# Patient Record
Sex: FEMALE | Race: WHITE | Employment: FULL TIME | URBAN - METROPOLITAN AREA
[De-identification: names, ages, dates, MRNs, and addresses within clinical notes are randomized per-mention and may not be internally consistent; named-entity substitution may affect disease eponyms.]

---

## 2017-05-26 ENCOUNTER — OFFICE VISIT (OUTPATIENT)
Dept: ENT CLINIC | Age: 52
End: 2017-05-26

## 2017-05-26 VITALS
BODY MASS INDEX: 24.16 KG/M2 | HEART RATE: 99 BPM | HEIGHT: 65 IN | DIASTOLIC BLOOD PRESSURE: 74 MMHG | WEIGHT: 145 LBS | SYSTOLIC BLOOD PRESSURE: 123 MMHG

## 2017-05-26 DIAGNOSIS — J30.1 SEASONAL ALLERGIC RHINITIS DUE TO POLLEN: ICD-10-CM

## 2017-05-26 DIAGNOSIS — Z86.69 HISTORY OF RECURRENT EAR INFECTION: Primary | ICD-10-CM

## 2017-05-26 DIAGNOSIS — R42 DISEQUILIBRIUM: ICD-10-CM

## 2017-05-26 DIAGNOSIS — H90.3 BILATERAL HIGH FREQUENCY SENSORINEURAL HEARING LOSS: ICD-10-CM

## 2017-05-26 DIAGNOSIS — H93.13 SUBJECTIVE TINNITUS OF BOTH EARS: ICD-10-CM

## 2017-05-26 PROCEDURE — 99244 OFF/OP CNSLTJ NEW/EST MOD 40: CPT | Performed by: OTOLARYNGOLOGY

## 2017-06-27 ENCOUNTER — OFFICE VISIT (OUTPATIENT)
Dept: ENT CLINIC | Age: 52
End: 2017-06-27

## 2017-06-27 VITALS — SYSTOLIC BLOOD PRESSURE: 139 MMHG | DIASTOLIC BLOOD PRESSURE: 82 MMHG | HEART RATE: 83 BPM

## 2017-06-27 DIAGNOSIS — R42 DIZZINESS AND GIDDINESS: Primary | ICD-10-CM

## 2017-06-27 PROCEDURE — 92540 BASIC VESTIBULAR EVALUATION: CPT | Performed by: AUDIOLOGIST

## 2017-06-27 PROCEDURE — 92557 COMPREHENSIVE HEARING TEST: CPT | Performed by: AUDIOLOGIST

## 2017-06-27 PROCEDURE — 92547 SUPPLEMENTAL ELECTRICAL TEST: CPT | Performed by: AUDIOLOGIST

## 2017-06-27 PROCEDURE — 92550 TYMPANOMETRY & REFLEX THRESH: CPT | Performed by: AUDIOLOGIST

## 2017-07-19 ENCOUNTER — OFFICE VISIT (OUTPATIENT)
Dept: ENT CLINIC | Age: 52
End: 2017-07-19

## 2017-07-19 VITALS
HEART RATE: 88 BPM | BODY MASS INDEX: 26.98 KG/M2 | WEIGHT: 161.9 LBS | SYSTOLIC BLOOD PRESSURE: 123 MMHG | HEIGHT: 65 IN | DIASTOLIC BLOOD PRESSURE: 70 MMHG

## 2017-07-19 DIAGNOSIS — R42 DIZZINESS AND GIDDINESS: Primary | ICD-10-CM

## 2017-07-19 DIAGNOSIS — H93.8X1 EAR PRESSURE, RIGHT: ICD-10-CM

## 2017-07-19 PROCEDURE — 99214 OFFICE O/P EST MOD 30 MIN: CPT | Performed by: OTOLARYNGOLOGY

## 2017-07-19 RX ORDER — LISINOPRIL 10 MG/1
TABLET ORAL
COMMUNITY
Start: 2017-06-02

## 2017-07-19 RX ORDER — TRIAMTERENE AND HYDROCHLOROTHIAZIDE 37.5; 25 MG/1; MG/1
1 CAPSULE ORAL DAILY
Qty: 30 CAPSULE | Refills: 1 | Status: SHIPPED | OUTPATIENT
Start: 2017-07-19

## 2017-08-31 ENCOUNTER — OFFICE VISIT (OUTPATIENT)
Dept: ORTHOPEDIC SURGERY | Age: 52
End: 2017-08-31

## 2017-08-31 VITALS — HEIGHT: 69 IN | BODY MASS INDEX: 23.85 KG/M2 | WEIGHT: 161 LBS

## 2017-08-31 DIAGNOSIS — M25.562 ACUTE PAIN OF LEFT KNEE: ICD-10-CM

## 2017-08-31 DIAGNOSIS — M22.42 CHONDROMALACIA OF PATELLA, LEFT: Primary | ICD-10-CM

## 2017-08-31 PROCEDURE — 99203 OFFICE O/P NEW LOW 30 MIN: CPT | Performed by: INTERNAL MEDICINE

## 2017-08-31 PROCEDURE — 73562 X-RAY EXAM OF KNEE 3: CPT | Performed by: INTERNAL MEDICINE

## 2017-08-31 PROCEDURE — L1820 KO ELAS W/ CONDYLE PADS & JO: HCPCS | Performed by: INTERNAL MEDICINE

## 2017-08-31 RX ORDER — KETOROLAC TROMETHAMINE 10 MG/1
10 TABLET, FILM COATED ORAL 3 TIMES DAILY
Qty: 15 TABLET | Refills: 0 | Status: SHIPPED | OUTPATIENT
Start: 2017-08-31

## 2017-09-01 ENCOUNTER — TELEPHONE (OUTPATIENT)
Dept: ORTHOPEDIC SURGERY | Age: 52
End: 2017-09-01

## 2017-09-08 ENCOUNTER — TELEPHONE (OUTPATIENT)
Dept: ORTHOPEDIC SURGERY | Age: 52
End: 2017-09-08

## 2017-09-08 NOTE — TELEPHONE ENCOUNTER
Patient calling stating that at her last office visit she was told if she was still in pain she could come in to get an injection. She is aware that Dr Tammy Mcclure is not in the office today however she does nto want to wait until next week. She does nto want to have to wait all weekend in pain. She is asking what she can do to relieve the pain? Can she see anyone today for an injection?      Please call to discuss  [phone]

## 2017-09-11 ENCOUNTER — TELEPHONE (OUTPATIENT)
Dept: ORTHOPEDIC SURGERY | Age: 52
End: 2017-09-11

## 2017-09-11 ENCOUNTER — OFFICE VISIT (OUTPATIENT)
Dept: ORTHOPEDIC SURGERY | Age: 52
End: 2017-09-11

## 2017-09-11 VITALS — BODY MASS INDEX: 23.85 KG/M2 | WEIGHT: 161 LBS | HEIGHT: 69 IN

## 2017-09-11 DIAGNOSIS — M22.42 CHONDROMALACIA OF PATELLA, LEFT: Primary | ICD-10-CM

## 2017-09-11 DIAGNOSIS — G89.29 CHRONIC PAIN OF LEFT KNEE: ICD-10-CM

## 2017-09-11 DIAGNOSIS — M25.562 CHRONIC PAIN OF LEFT KNEE: ICD-10-CM

## 2017-09-11 PROCEDURE — 20611 DRAIN/INJ JOINT/BURSA W/US: CPT | Performed by: INTERNAL MEDICINE

## 2017-09-11 PROCEDURE — 99213 OFFICE O/P EST LOW 20 MIN: CPT | Performed by: INTERNAL MEDICINE

## 2017-09-11 RX ORDER — TRAMADOL HYDROCHLORIDE 50 MG/1
50 TABLET ORAL EVERY 6 HOURS PRN
Qty: 30 TABLET | Refills: 0 | Status: SHIPPED | OUTPATIENT
Start: 2017-09-11 | End: 2017-10-19 | Stop reason: SDUPTHER

## 2017-09-11 RX ORDER — MELOXICAM 15 MG/1
15 TABLET ORAL DAILY PRN
Qty: 30 TABLET | Refills: 2 | Status: SHIPPED | OUTPATIENT
Start: 2017-09-11 | End: 2019-05-28 | Stop reason: SDUPTHER

## 2017-09-13 ENCOUNTER — OFFICE VISIT (OUTPATIENT)
Dept: ORTHOPEDIC SURGERY | Age: 52
End: 2017-09-13

## 2017-09-13 DIAGNOSIS — M94.262 CHONDROMALACIA, KNEE, LEFT: ICD-10-CM

## 2017-09-13 DIAGNOSIS — M23.301 DEGENERATIVE TEAR OF LATERAL MENISCUS OF LEFT KNEE: ICD-10-CM

## 2017-09-13 DIAGNOSIS — M17.12 PRIMARY OSTEOARTHRITIS OF LEFT KNEE: Primary | ICD-10-CM

## 2017-09-13 PROCEDURE — 99213 OFFICE O/P EST LOW 20 MIN: CPT | Performed by: INTERNAL MEDICINE

## 2017-09-18 PROBLEM — M94.269 CHONDROMALACIA, KNEE: Status: ACTIVE | Noted: 2017-09-18

## 2017-09-18 PROBLEM — M17.12 PRIMARY OSTEOARTHRITIS OF LEFT KNEE: Status: ACTIVE | Noted: 2017-09-18

## 2017-09-19 ENCOUNTER — HOSPITAL ENCOUNTER (OUTPATIENT)
Dept: PHYSICAL THERAPY | Age: 52
Discharge: HOME | End: 2017-09-30
Admitting: INTERNAL MEDICINE

## 2017-09-19 NOTE — FLOWSHEET NOTE
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, left  · Treatment Diagnosis: M25.562 L knee pain  Insurance/Certification information:  PT Insurance Information: [place], $25 co-pay, 30 visits  Physician Information:  Referring Practitioner: Ric Carranza MD  Plan of care signed (Y/N):     Date of Patient follow up with Physician:     G-Code (if applicable):      Date G-Code Applied:    PT G-Codes  Functional Assessment Tool Used: LEFS  Score: 31.25% disability  Functional Limitation: Mobility: Walking and moving around  Mobility: Walking and Moving Around Current Status (): At least 20 percent but less than 40 percent impaired, limited or restricted  Mobility: Walking and Moving Around Goal Status ():  At least 1 percent but less than 20 percent impaired, limited or restricted    Progress Note: [x]  Yes  []  No  Next due by: Visit #10       Latex Allergy:  [x]NO      []YES  Preferred Language for Healthcare:   [x]English       []other:    Visit # Insurance Allowable   1 30     Pain level:  0/10     SUBJECTIVE:  See eval    OBJECTIVE: See eval  Observation:   Test measurements:      RESTRICTIONS/PRECAUTIONS: HTN    Exercises/Interventions:     Hamstring stretch Verbal cueing for 2 for 30 seconds   Quad stretch Verbal cueing for 2 for 30 seconds   Gastroc stretch Verbal cueing for 2 for 30 seconds   IT band stretch Verbal cueing for 2 for 30 seconds   Quad sets Verbal cueing for 2 sets of 10   Short arc quads Verbal cueing for 1 sets of 15 Functional Deficits. - Patient will return to desired, higher level,  functional activities without increased symptoms or restriction. Progression Towards Functional goals:  [] Patient is progressing as expected towards functional goals listed. [] Progression is slowed due to complexities listed. [] Progression has been slowed due to co-morbidities. [x] Plan just implemented, too soon to assess goals progression  [] Other:     ASSESSMENT:  See eval    Treatment/Activity Tolerance:  [x] Patient tolerated treatment well [] Patient limited by fatique  [] Patient limited by pain  [] Patient limited by other medical complications  [x] Other: Pt denied pain with interventions and verbalized and demonstrated understanding for all interventions this date. Prognosis: [x] Good [] Fair  [] Poor    Patient Requires Follow-up: [x] Yes  [] No    PLAN: See eval  [] Continue per plan of care [] Alter current plan (see comments)  [x] Plan of care initiated [] Hold pending MD visit [] Discharge    Plan for Next Session:  Progress quad control activities as tolerated with a functional progression toward gravity reduced track and more upright functional positions.     Electronically signed by: Ko Islas PT, DPT, ATC

## 2017-09-19 NOTE — PLAN OF CARE
[address] [address]  Phone [phone]   Fax [phone]                                                      Physical Therapy Certification    Dear Referring Practitioner: Juan Antonio Doll MD,    We had the pleasure of evaluating the following patient for physical therapy services at [address] [place] [address]. A summary of our findings can be found in the initial assessment below. This includes our plan of care. If you have any questions or concerns regarding these findings, please do not hesitate to contact me at the office phone number checked above. Thank you for the referral.       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 The pt reports she moved 8 yards of [facility] in . 10 days after she woke up and her left knee ached. She worked through it and referred to her doctor who agreed. Then pain returned in August \"full force\". Pt denies previous injuries to her left knee. Pt had an x-ray and MRI and a cortisone injection 10 days ago.  Her physician prescribed a knee brace with a lateral buttress. Relevant Medical History:HTN    Functional Disability Index:LEFS 31.25% diability    Pain Scale: currently 0/10, at best 0/10, at worst 10/10    Type:    - Intermittent     Easing factors:    - resting  - medications  - ice    Provocative factors:    - sitting  - standing  - walking  - activity and movement    Night Pain:  - complained of night pain associated with sleep position. Paresthesia complaints:   - no paresthesia complaints     Functional Limitations/Impairments:   - Standing   - Walking      - Squatting/bending   - kneeling  - lifting  - sleeping   - Stairs             - ADL's   - Sports/Recreation         Occupation/School:   -  and     Living Status/Prior Level of Function: This patient was independent in ADL's and IADL's prior to onset of symptoms. Sport/recreational activities:   - walking  - running  - backyard basketball with son     PACEMAKER:  - Denied having a pacemaker that would contraindicate the use of electrical modalities. METAL IMPLANTS:  - Denied metal implants that would contraindicate the use of thermal modalities. CANCER HISTORY:  - Denied a history of cancer that would contraindicate thermal modalities. OBJECTIVE:      Posture: normal patella height    Circumferential Measures:   Inferior pole 36.0 cm, superior pole 38.0 cm, 2 cm proximal 38.5 cm  inferior pole 36.0 cm, superior pole 37.5 cm, 2 cm proximal 39.0 cm    Palpation:   -demonstrated diffuse tenderness to palpation over the soft tissues of the IT band insertion.     Joint mobility and reactivity:   Normal  patella mobility, crepitus felt    ROM:   L 0-134 degrees, R WFL    Strength/Neuromuscular control:  Hip flexion: R 4/5, L 4-/5  IR R 4/5, L 4/5   ER R 4/5, L 4/5  Knee ext R 5/5, L 4+/5  Knee flexion R 4+/5, L 4-/5 painful  DF R 5/5, L 5/5   PF R 5/5, L 5/5    Voluntary quadriceps contraction:   [] Good (Strong and voluntary contraction)  [x] Good - (voluntary but assymetrical contraction)   [] Fair + (voluntary but weak contraction)   [] Fair - (voluntary but inconsistent contraction)  [] Poor (no volitional contraction)    Dermatomes and DTRs not warranted    Orthopedic Special Tests:    - Joint Line Compression  - Overpressure  +crepitus  - tilt    Functional Mobility/Transfers:   Functional transitions:  Sit to stand: []slow and labored [x]independent []min assist []mod assist []max assist   Plinth mobility:[]slow and labored [x]independent []min assist []mod assist []max assist    Gait: (include devices/WB status)  - demonstrated no gait deviations and a normal gait pattern  Weightbearing:  []NWB  []WBAT  [x]unrestricted   []other:      Special Tests/Functional tests:  Timed Up and Go (TUG):  [x]   12 seconds or faster (0% functional limitation)  []   13-14 seconds (At least 1% but less than 20% functional limitation)  []   15-16 seconds (At least 20% but less than 40% functional limitation)  []   17-18 seconds (At least 40% but less than 60% functional limitation)  []   19-20 seconds (At least 60% but less than 80% functional limitation)  []   21-22 seconds (At least 80% but less than 100% functional limitation)  []   23 seconds or slower (100% functional limitation)             [x] Patient history, allergies, meds reviewed. Medical chart reviewed. See intake form. Review Of Systems (ROS):  [x]Performed Review of systems (Integumentary, CardioPulmonary, Neurological) by intake and observation. Intake form has been scanned into medical record. Patient has been instructed to contact their primary care physician regarding ROS issues if not already being addressed at this time. Objective Review of Systems:  Cognitive, Communication, Behavior and Learning:  - The patient was alert, spoke coherently and was oriented to person, place and time.   - Demonstrated no barriers to communication or processing information.  - Appeared barriers. Tolerance of evaluation/treatment:   - Good     Physical Therapy Evaluation Complexity Justification  [] A history of present problem with:  [] no personal factors and/or comorbidities that impact the plan of care;  []1-2 personal factors and/or comorbidities that impact the plan of care  [x]3 personal factors and/or comorbidities that impact the plan of care  [] An examination of body systems using standardized tests and measures addressing any of the following: body structures and functions (impairments), activity limitations, and/or participation restrictions;:  [] a total of 1-2 or more elements   [x] a total of 3 or more elements   [] a total of 4 or more elements   [] A clinical presentation with:  [x] stable and/or uncomplicated characteristics   [] evolving clinical presentation with changing characteristics  [] unstable and unpredictable characteristics;   [] Clinical decision making of [x] low, [] moderate, [] high complexity using standardized patient assessment instrument and/or measurable assessment of functional outcome.     [x] EVAL (LOW) 85401 (typically 30 minutes face-to-face)  [] EVAL (MOD) 05044 (typically 30 minutes face-to-face)  [] EVAL (HIGH) 34840 (typically 45 minutes face-to-face)  [] RE-EVAL     Co-morbidities/Complexities (which will affect course of rehabilitation):   []None           Arthritic conditions   []Rheumatoid arthritis (M05.9)  []Osteoarthritis (M19.91)   Cardiovascular conditions   [x]Hypertension (I10)  []Hyperlipidemia (E78.5)  []Angina pectoris (I20)  []Atherosclerosis (I70)   Musculoskeletal conditions   []Disc pathology   []Congenital spine pathologies   []Prior surgical intervention  []Osteoporosis (M81.8)  []Osteopenia (M85.8)   Endocrine conditions   []Hypothyroid (E03.9)  []Hyperthyroid Gastrointestinal conditions   []Constipation (O90.64)   Metabolic conditions   []Morbid obesity (E66.01)  []Diabetes type 1(E10.65) or 2 (E11.65)   []Neuropathy (G60.9)

## 2017-10-04 ENCOUNTER — OFFICE VISIT (OUTPATIENT)
Dept: ORTHOPEDIC SURGERY | Age: 52
End: 2017-10-04

## 2017-10-04 DIAGNOSIS — M94.262 CHONDROMALACIA, KNEE, LEFT: ICD-10-CM

## 2017-10-04 DIAGNOSIS — M22.42 CHONDROMALACIA OF PATELLA, LEFT: ICD-10-CM

## 2017-10-04 DIAGNOSIS — M17.12 PRIMARY OSTEOARTHRITIS OF LEFT KNEE: Primary | ICD-10-CM

## 2017-10-04 PROBLEM — M22.40 CHONDROMALACIA OF PATELLA: Status: ACTIVE | Noted: 2017-10-04

## 2017-10-04 PROCEDURE — 99213 OFFICE O/P EST LOW 20 MIN: CPT | Performed by: INTERNAL MEDICINE

## 2017-10-04 NOTE — PROGRESS NOTES
Chief Complaint:   Chief Complaint   Patient presents with    Knee Pain     f/u L. knee pain          History of Present Illness:       Patient is a 50 y.o. female returns follow up for the above complaint. The patient was last seen approximately 3 weeksago. The symptoms are worsening since the last visit. The patient has had no further testing for the problem. The patient is noted gradual worsening of her knee pain but it has not achieved pretreatment status with respect to pain. Majority of her pain localizes to the lateral greater than medial border of the patella in the anterior compartment regions any sheet sometime since his pain posteriorly but she feels that this is related to the functional knee brace. No pattern of activity related swelling she was unable to attend only one session of physical therapy relative to monetary constraints later to insurance coverage of physical therapy requiring a high deductible payment up front.     She has transition to home exercises despite this she has noted general worsening of her knee pain she will like to consider other options for treatment     Past Medical History:        Past Medical History:   Diagnosis Date    Anesthesia     slow to wake    Depression     Environmental allergies         Present Medications:         Current Outpatient Prescriptions   Medication Sig Dispense Refill    meloxicam (MOBIC) 15 MG tablet Take 1 tablet by mouth daily as needed for Pain 30 tablet 2    ketorolac (TORADOL) 10 MG tablet Take 1 tablet by mouth three times daily Discontinue all other NSAIDs during the course of treatment and resumed thereafter 15 tablet 0    lisinopril (PRINIVIL;ZESTRIL) 10 MG tablet       triamterene-hydrochlorothiazide (DYAZIDE) 37.5-25 MG per capsule Take 1 capsule by mouth daily 30 capsule 1    esomeprazole Magnesium (NEXIUM) 40 MG PACK Take 40 mg by mouth daily      venlafaxine (EFFEXOR-XR) 150 MG XR capsule Take 150 mg by mouth daily  Primary osteoarthritis of left knee Yes    Chondromalacia, knee, left     Chondromalacia of patella, left               Plan: Activity modification-PPP  Continue functional patellofemoral bracing with ADLs. Euflexxa injection series trial preauthorization  Transition to Functional program-Program reflective of PPP    I believe her pain generator is primarily of the anterior compartment and/or lateral compartment chondromalacia proceed as outlined above       Orders:        Orders Placed This Encounter   Procedures    Ambulatory referral to Physical Therapy     Referral Priority:   Routine     Referral Type:   Eval and Treat     Referral Reason:   Specialty Services Required     Requested Specialty:   Physical Therapy     Number of Visits Requested:   1         Sabina Cabral MD.      Neftali Due: \"This note was dictated with voice recognition software. Though review and correction are routine, we apologize for any errors. \"

## 2017-10-05 ENCOUNTER — TELEPHONE (OUTPATIENT)
Dept: ORTHOPEDIC SURGERY | Age: 52
End: 2017-10-05

## 2017-10-10 ENCOUNTER — TELEPHONE (OUTPATIENT)
Dept: ORTHOPEDIC SURGERY | Age: 52
End: 2017-10-10

## 2017-10-10 NOTE — TELEPHONE ENCOUNTER
called patient to ler her know her HA inj was approved. Patient was unsue of schedule, she will be calling back to schedule her appt.

## 2017-10-16 ENCOUNTER — OFFICE VISIT (OUTPATIENT)
Dept: ORTHOPEDIC SURGERY | Age: 52
End: 2017-10-16

## 2017-10-16 DIAGNOSIS — M94.262 CHONDROMALACIA OF LEFT KNEE: ICD-10-CM

## 2017-10-16 DIAGNOSIS — M22.42 CHONDROMALACIA OF LEFT PATELLA: ICD-10-CM

## 2017-10-16 DIAGNOSIS — M17.12 PRIMARY OSTEOARTHRITIS OF LEFT KNEE: Primary | ICD-10-CM

## 2017-10-16 PROCEDURE — 99212 OFFICE O/P EST SF 10 MIN: CPT | Performed by: INTERNAL MEDICINE

## 2017-10-16 PROCEDURE — 20610 DRAIN/INJ JOINT/BURSA W/O US: CPT | Performed by: INTERNAL MEDICINE

## 2017-10-16 NOTE — PROGRESS NOTES
administration details:    Date:  10/16/17    Site:L. knee    Lot #:  [id number]    Expiration Date: 8/18/2018    NDC#: [address]

## 2017-10-16 NOTE — PROGRESS NOTES
MULTIVITAMIN-MINERALS) tablet Take 1 tablet by mouth daily      ferrous sulfate 325 (65 FE) MG tablet Take 325 mg by mouth daily (with breakfast)       No current facility-administered medications for this visit. Allergies: Allergies   Allergen Reactions    Latex      rash    Other      bandaid blister           Review of Systems:    Pertinent items are noted in HPI        Vital Signs: There were no vitals filed for this visit. General Exam:     Constitutional: Patient is adequately groomed with no evidence of malnutrition    Physical Exam: left knee      Primary Exam:    Inspection:  No deformity atrophy or effusion      Palpation:  No focal trigger point tenderness      Range of Motion:  Stable change from previous      Strength:  Normal with SLR      Special Tests:  Ligamentous testing stable      Skin: There are no rashes, ulcerations or lesions. Gait: nonantalgic      Neurovascular - non focal and intact       Additional Comments:        Additional Examinations:                   Office Imaging Results/Procedures PerformedToday:     Logic E Ultrasound/ 10 HZ    The patient was placed supine on the examination table with the knees supported. The   left     Lower extremity was slightly abducted . The ultrasound was placed on knee preset function and the linear transducer was placed transversely  over the medial patellofemoral joint and the medial patella femoral  joint recess was identified. The skin was prepped in sterile fashion. Sterile ultrasound gel  and topical anesthetic were utilized. Using axial technique, a 22 GA 40 mm needle was advanced under direct guidance into the medial patellofemoral joint recess and 2 ml of Euflexxa  was injected . The joint space was visualized distending with Injectate. There was no resistance to the Injectate. Patient tolerated this with minimal to no discomfort. Band-Aid applied to puncture wound.     Technically successful ultrasound guided injection    The media patellafemoral joint recess was visualized in short axis. No evidence of effusion, soft tissue mass or cystic lesions. Office Procedures:     Orders Placed This Encounter   Procedures    US Guided Needle Placement     Order Specific Question:   Reason for exam:     Answer:   knee pain    ME ARTHROCENTESIS ASPIR&/INJ MAJOR JT/BURSA W/O US    EUFLEXXA INJ PER DOSE           Other Outside Imaging and Testing Personally Reviewed:       none          Assessment   Impression: . Encounter Diagnoses   Name Primary?  Primary osteoarthritis of left knee Yes    Chondromalacia of left knee     Chondromalacia of left patella               Plan:        postinjection protocol  Cane/crutch-assisted weightbearing short-term for the next 3-5 days  Continue home exercise program and transition/start  GA P program  Continue NSAIDs/Mobic when necessary GI precaution       Orders:        Orders Placed This Encounter   Procedures    US Guided Needle Placement     Order Specific Question:   Reason for exam:     Answer:   knee pain    ME ARTHROCENTESIS ASPIR&/INJ MAJOR JT/BURSA W/O US   Analy Bravo MD.      Disclaimer: \"This note was dictated with voice recognition software. Though review and correction are routine, we apologize for any errors. \"

## 2017-10-19 ENCOUNTER — HOSPITAL ENCOUNTER (OUTPATIENT)
Dept: PHYSICAL THERAPY | Age: 52
Discharge: HOME | End: 2017-10-19
Admitting: INTERNAL MEDICINE

## 2017-10-19 DIAGNOSIS — M22.42 CHONDROMALACIA OF PATELLA, LEFT: ICD-10-CM

## 2017-10-19 RX ORDER — TRAMADOL HYDROCHLORIDE 50 MG/1
50 TABLET ORAL EVERY 6 HOURS PRN
Qty: 30 TABLET | Refills: 0 | Status: SHIPPED | OUTPATIENT
Start: 2017-10-19 | End: 2017-10-29

## 2017-10-19 NOTE — FLOWSHEET NOTE
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, left  · Treatment Diagnosis: M25.562 L knee pain  Insurance/Certification information:  PT Insurance Information: [place], $25 co-pay, 30 visits  Physician Information:  Referring Practitioner: Cristal Jackson MD  Plan of care signed (Y/N):     Date of Patient follow up with Physician: Oriana Champagne (if applicable):      Date G-Code Applied:    PT G-Codes  Functional Assessment Tool Used: LEFS  Score: 31.25% disability  Functional Limitation: Mobility: Walking and moving around  Mobility: Walking and Moving Around Current Status (): At least 20 percent but less than 40 percent impaired, limited or restricted  Mobility: Walking and Moving Around Goal Status (): At least 1 percent but less than 20 percent impaired, limited or restricted    Progress Note: [x]  Yes  []  No  Next due by: Visit #10       Latex Allergy:  [x]NO      []YES  Preferred Language for Healthcare:   [x]English       []other:    Visit # Insurance Allowable   2 30     Pain level:  9/10     SUBJECTIVE:   Pain is about 9-10 and had injection on Monday. Has next injection on Tuesday. Knee has been more painful since and unable to tolerate much since but icing. Having increased pain with knee extension and pain most of the time since. Taking mobic and tramadol and has had no change in symptoms.  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 RESTRICTIONS/PRECAUTIONS: HTN    Exercises/Interventions:   Hamstring long sit stretch  3 for 30 seconds   Quad stretch Prone 3 for 30 seconds   Gastroc  TP stretch  2 for 30 seconds   IT band stretch    Quad sets  into towel 10\" 10x    Short arc quads  3x 10   Flexion SLR  2x 10   W/ extension lag    Abduction SLR   2 x of 10   bridges  2x 10   CLAMS    2x 10       - Provided instruction in patellofemoral protection strategies to include minimizing stair climbing, no flexion under weightbearing greater than 40 degrees of flexion, no open chain flexion to extension against resistance ( including knee extension machine and breast stroke kicking), no deep knee bending, no kneeling for extended periods and emphasis on lower impact cardio exercise. Therapeutic Exercise and NMR EXR  [x] (07438) Provided verbal/tactile cueing for activities related to strengthening, flexibility, endurance, ROM for improvements in LE, proximal hip, and core control with self care, mobility, lifting, ambulation.  [] (69605) Provided verbal/tactile cueing for activities related to improving balance, coordination, kinesthetic sense, posture, motor skill, proprioception  to assist with LE, proximal hip, and core control in self care, mobility, lifting, ambulation and eccentric single leg control.      NMR and Therapeutic Activities:    [x] (21615 or 83768) Provided verbal/tactile cueing for activities related to improving balance, coordination, kinesthetic sense, posture, motor skill, proprioception and motor activation to allow for proper function of core, proximal hip and LE with self care and ADLs  [] (04035) Gait Re-education- Provided training and instruction to the patient for proper LE, core and proximal hip recruitment and positioning and eccentric body weight control with ambulation re-education including up and down stairs     Home Functional Deficits. - Patient will demonstrate an increase in Strength to full and painfree to resistance testing to allow for proper functional mobility as indicated by patients Functional Deficits. - Patient will return to desired, higher level,  functional activities without increased symptoms or restriction. Progression Towards Functional goals:  [] Patient is progressing as expected towards functional goals listed. [] Progression is slowed due to complexities listed. [] Progression has been slowed due to co-morbidities. [x] Plan just implemented, too soon to assess goals progression  [] Other:     ASSESSMENT: poor tolerance to program today due to increased pain etc.  Treatment/Activity Tolerance:  [x] Patient tolerated treatment well [] Patient limited by fatique  [] Patient limited by pain  [] Patient limited by other medical complications  [] Other:   Prognosis: [x] Good [] Fair  [] Poor    Patient Requires Follow-up: [x] Yes  [] No    PLAN:push strength and stability with quad muscle and encourage obtaining full rom and using AD for wb to decrease stress to knee. Monitor job demands as able.    [x] Continue per plan of care [] Alter current plan (see comments)  [] Plan of care initiated [] Hold pending MD visit [] Discharge    Electronically signed by: Roxanne Gamez PTA, [id number]

## 2017-10-24 ENCOUNTER — OFFICE VISIT (OUTPATIENT)
Dept: ORTHOPEDIC SURGERY | Age: 52
End: 2017-10-24

## 2017-10-24 DIAGNOSIS — M17.12 PRIMARY OSTEOARTHRITIS OF LEFT KNEE: Primary | ICD-10-CM

## 2017-10-24 PROCEDURE — 20611 DRAIN/INJ JOINT/BURSA W/US: CPT | Performed by: INTERNAL MEDICINE

## 2017-10-24 PROCEDURE — 99999 PR OFFICE/OUTPT VISIT,PROCEDURE ONLY: CPT | Performed by: INTERNAL MEDICINE

## 2017-10-25 NOTE — PROGRESS NOTES
administration details:    Date:  10/24/17    Site:L. knee    Lot #:  [id number]    Expiration Date: 09/24/2018    NDC#: [id number]

## 2017-10-27 ENCOUNTER — HOSPITAL ENCOUNTER (OUTPATIENT)
Dept: PHYSICAL THERAPY | Age: 52
Discharge: HOME | End: 2017-10-27
Admitting: INTERNAL MEDICINE

## 2017-10-27 NOTE — FLOWSHEET NOTE
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, left  · Treatment Diagnosis: M25.562 L knee pain  Insurance/Certification information:  PT Insurance Information: [place], $25 co-pay, 30 visits  Physician Information:  Referring Practitioner: Janessa Luther MD  Plan of care signed (Y/N):     Date of Patient follow up with Physician: Jori Dahl (if applicable):      Date G-Code Applied:    PT G-Codes  Functional Assessment Tool Used: LEFS  Score: 31.25% disability  Functional Limitation: Mobility: Walking and moving around  Mobility: Walking and Moving Around Current Status (): At least 20 percent but less than 40 percent impaired, limited or restricted  Mobility: Walking and Moving Around Goal Status (): At least 1 percent but less than 20 percent impaired, limited or restricted    Progress Note: [x]  Yes  []  No  Next due by: Visit #10       Latex Allergy:  [x]NO      []YES  Preferred Language for Healthcare:   [x]English       []other:    Visit # Insurance Allowable   3 30     Pain level:  3/10     SUBJECTIVE:   Prolonged sitting increases her aching. Injection  Has helped, less pain   OBJECTIVE:   Observation:   Test measurements:    10/19/17  Increased flexion increases pain and pt demonstrates a TKE gait w/ moderate antalgia. 10/27/17  Improved gait entering clinic today. less antalgia present     RESTRICTIONS/PRECAUTIONS: HTN    Exercises/Interventions: 39'  Hamstring long sit stretch  3 for 30 seconds   Quad stretch Prone 3 for 30 seconds   Gastroc   incline boardstretch  2 for 30 seconds   IT band stretch    Quad sets  into towel 10\" 10x    Short arc quads  3x 10  2#    Flexion SLR  3x 10   Wt NPV   Abduction SLR   3 x of 10    bridges  2x 10 w/ BS   LP B  45# x20   MINISquats  x30    Tandem balance 2x 30\" airmat    ROM Knee w/ sheetpull  NPV   Manual stretching  10' hamstrings/ knee flexion/knee ROM/ITB   CLAMS    2x 10 wine        - Provided instruction in patellofemoral protection strategies to include minimizing stair climbing, no flexion under weightbearing greater than 40 degrees of flexion, no open chain flexion to extension against resistance ( including knee extension machine and breast stroke kicking), no deep knee bending, no kneeling for extended periods and emphasis on lower impact cardio exercise. Therapeutic Exercise and NMR EXR  [x] (38624) Provided verbal/tactile cueing for activities related to strengthening, flexibility, endurance, ROM for improvements in LE, proximal hip, and core control with self care, mobility, lifting, ambulation.  [] (96070) Provided verbal/tactile cueing for activities related to improving balance, coordination, kinesthetic sense, posture, motor skill, proprioception  to assist with LE, proximal hip, and core control in self care, mobility, lifting, ambulation and eccentric single leg control.      NMR and Therapeutic Activities:    [x] (24804 or 14094) Provided verbal/tactile cueing for activities related to improving balance, coordination, kinesthetic sense, posture, motor skill, proprioception and motor activation to allow for proper function of core, proximal hip and LE with self care and ADLs  [] (12517) Gait Re-education- Provided training and instruction to the patient for proper LE, core and proximal hip recruitment and positioning and eccentric body weight control with ambulation re-education including up and down stairs     Home Exercise Program:    [x] (79966) Reviewed/Progressed increase in Strength to full and painfree to resistance testing to allow for proper functional mobility as indicated by patients Functional Deficits. - Patient will return to desired, higher level,  functional activities without increased symptoms or restriction. Progression Towards Functional goals:  [] Patient is progressing as expected towards functional goals listed. [] Progression is slowed due to complexities listed. [] Progression has been slowed due to co-morbidities. [x] Plan just implemented, too soon to assess goals progression  [] Other:     ASSESSMENT: pt tolerated program much better today, less fatigue with progressions. Treatment/Activity Tolerance:  [x] Patient tolerated treatment well [] Patient limited by fatique  [] Patient limited by pain  [] Patient limited by other medical complications  [] Other:   Prognosis: [x] Good [] Fair  [] Poor    Patient Requires Follow-up: [x] Yes  [] No    PLAN:push strength and stability with quad muscle and encourage obtaining full rom and using AD for wb to decrease stress to knee. Monitor job demands as able.    [x] Continue per plan of care [] Alter current plan (see comments)  [] Plan of care initiated [] Hold pending MD visit [] Discharge    Electronically signed by: Beto Browne PTA, [id number]

## 2017-10-31 ENCOUNTER — OFFICE VISIT (OUTPATIENT)
Dept: ORTHOPEDIC SURGERY | Age: 52
End: 2017-10-31

## 2017-10-31 DIAGNOSIS — M17.12 PRIMARY OSTEOARTHRITIS OF LEFT KNEE: Primary | ICD-10-CM

## 2017-10-31 PROCEDURE — 99999 PR OFFICE/OUTPT VISIT,PROCEDURE ONLY: CPT | Performed by: INTERNAL MEDICINE

## 2017-10-31 PROCEDURE — 20611 DRAIN/INJ JOINT/BURSA W/US: CPT | Performed by: INTERNAL MEDICINE

## 2017-10-31 NOTE — PROGRESS NOTES
administration details:    Date:  10/31/17    Site: L knee    Lot #:  [id number]    Expiration Date: 10/27/2018    NDC#: [address]

## 2017-11-01 ENCOUNTER — TELEPHONE (OUTPATIENT)
Dept: ORTHOPEDIC SURGERY | Age: 52
End: 2017-11-01

## 2017-11-01 ENCOUNTER — HOSPITAL ENCOUNTER (OUTPATIENT)
Dept: PHYSICAL THERAPY | Age: 52
Discharge: HOME | End: 2017-11-30
Attending: INTERNAL MEDICINE | Admitting: INTERNAL MEDICINE

## 2017-11-02 ENCOUNTER — HOSPITAL ENCOUNTER (OUTPATIENT)
Dept: PHYSICAL THERAPY | Age: 52
Discharge: HOME | End: 2017-11-02
Admitting: INTERNAL MEDICINE

## 2017-11-02 NOTE — FLOWSHEET NOTE
[place] , 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, left  · Treatment Diagnosis: M25.562 L knee pain  Insurance/Certification information:  PT Insurance Information: [employer], $25 co-pay, 30 visits  Physician Information:  Referring Practitioner: Abby Murdock MD  Plan of care signed (Y/N):     Date of Patient follow up with Physician: Ifeanyi Lyman (if applicable):      Date G-Code Applied:    PT G-Codes  Functional Assessment Tool Used: LEFS  Score: 31.25% disability  Functional Limitation: Mobility: Walking and moving around  Mobility: Walking and Moving Around Current Status (): At least 20 percent but less than 40 percent impaired, limited or restricted  Mobility: Walking and Moving Around Goal Status (): At least 1 percent but less than 20 percent impaired, limited or restricted    Progress Note: [x]  Yes  []  No  Next due by: Visit #10       Latex Allergy:  [x]NO      []YES  Preferred Language for Healthcare:   [x]English       []other:    Visit # Insurance Allowable        Pain level:  4/10     SUBJECTIVE:  Pt reports she had her 3rd injection on Tuesday, and her knee was very painful and swollen afterwards. Pt states that pain medication did not even help initially, but today is the first day that her knee is starting to feel a little better. Pt reports sitting for any length of time is the most painful position for her.      OBJECTIVE:   Observation:   Test measurements:    10/19/17  Increased flexion increases pain and pt demonstrates a TKE gait w/ moderate antalgia. RESTRICTIONS/PRECAUTIONS: HTN    Exercises/Interventions:   Hamstring long sit stretch  3x30\"   Quad stretch Prone 3x30\"   Gastroc incline board stretch 3x30\"   IT band stretch    Quad sets  into towel 10\" 10x    Short arc quads  3x 10 2#   Flexion SLR  3x 10   Wt NPV  Held wt 11/2 due to knee pain    Abduction SLR 3 x of 10   LP B Held 11/2 due to pain    Tandem stance 2x30\" airmat    Mini squats x30   bridges  2x 10   Standing HR 2x10   Standing hip abduction 2x10 (L only)   CLAMS 2x 10 (wine)   Manual Therapy:  Hamstring stretch/ITB stretch/knee ROM   10'       - Provided instruction in patellofemoral protection strategies to include minimizing stair climbing, no flexion under weightbearing greater than 40 degrees of flexion, no open chain flexion to extension against resistance ( including knee extension machine and breast stroke kicking), no deep knee bending, no kneeling for extended periods and emphasis on lower impact cardio exercise. Therapeutic Exercise and NMR EXR  [x] (04624) Provided verbal/tactile cueing for activities related to strengthening, flexibility, endurance, ROM for improvements in LE, proximal hip, and core control with self care, mobility, lifting, ambulation.  [] (56331) Provided verbal/tactile cueing for activities related to improving balance, coordination, kinesthetic sense, posture, motor skill, proprioception  to assist with LE, proximal hip, and core control in self care, mobility, lifting, ambulation and eccentric single leg control.      NMR and Therapeutic Activities:    [x] (61628 or 80649) Provided verbal/tactile cueing for activities related to improving balance, coordination, kinesthetic sense, posture, motor skill, proprioception and motor activation to allow for proper function of core, proximal hip and LE with self care and ADLs  [] (34959) Gait Re-education- Provided training and instruction to the patient for proper LE, core and proximal hip recruitment and positioning and eccentric body weight control with ambulation re-education including up and down stairs     Home Exercise Program:    [x] (46872) Reviewed/Progressed HEP activities related to strengthening, flexibility, endurance, ROM of core, proximal hip and LE for functional self-care, mobility, lifting and ambulation/stair navigation   [] (84881)Reviewed/Progressed HEP activities related to improving balance, coordination, kinesthetic sense, posture, motor skill, proprioception of core, proximal hip and LE for self care, mobility, lifting, and ambulation/stair navigation      Manual Treatments:  PROM / STM / Oscillations-Mobs:  G-I, II, III, IV (PA's, Inf., Post.)  [x] (46748) Provided manual therapy to mobilize LE, proximal hip and/or LS spine soft tissue/joints for the purpose of modulating pain, promoting relaxation,  increasing ROM, reducing/eliminating soft tissue swelling/inflammation/restriction, improving soft tissue extensibility and allowing for proper ROM for normal function with self care, mobility, lifting and ambulation. Modalities:   CP/ ES 15'    Charges:  Timed Code Treatment Minutes: 39'   Total Treatment Minutes: 60'     [] EVAL  [x] MM(72846) x  2   [] IONTO  [] NMR (10922) x      [] VASO  [x] Manual (34490) x  1    [] Other:  [] TA x       [] Mech Traction (49862)  [] ES(attended) (03210)      [x] ES (un) (31743):     GOALS:  Patient stated goal: to not hurt    Therapist goals for Patient:   Short Term Goals: To be achieved in: 2 weeks  - Independent in HEP and progression per patient tolerance, in order to prevent re-injury. - Patient will have a decrease in pain to facilitate improvement in movement, function, and ADLs as indicated by Functional Deficits. Long Term Goals:  To be achieved in: 6 weeks  - The patient is expected to demonstrate less than 20% impairment, limitation or restriction in:  - walking and moving around (mobility)  - Patient will demonstrate increased passive and active ROM to full, symmetrical and painfree to allow for proper joint functioning as indicated by patients Functional Deficits. - Patient will demonstrate an increase in Strength to full and painfree to resistance testing to allow for proper functional mobility as indicated by patients Functional Deficits. - Patient will return to desired, higher level,  functional activities without increased symptoms or restriction. Progression Towards Functional goals:  [] Patient is progressing as expected towards functional goals listed. [] Progression is slowed due to complexities listed. [] Progression has been slowed due to co-morbidities. [x] Plan just implemented, too soon to assess goals progression  [] Other:     ASSESSMENT:  Pt was limited in session today due to increased L knee pain. Held leg press due to knee pain. Added HR and standing hip abduction with no increased L knee pain. Treatment/Activity Tolerance:  [x] Patient tolerated treatment well [] Patient limited by fatique  [] Patient limited by pain  [] Patient limited by other medical complications  [] Other:   Prognosis: [x] Good [] Fair  [] Poor    Patient Requires Follow-up: [x] Yes  [] No    PLAN: Continue to push strength and stability with quad muscle as able. Monitor pain levels and modify job demands as able.     [x] Continue per plan of care [] Alter current plan (see comments)  [] Plan of care initiated [] Hold pending MD visit [] Discharge    Electronically signed by: Pankaj Garcia \[facility]\"", [id number]

## 2017-11-02 NOTE — PROGRESS NOTES
Chief Complaint:   Chief Complaint   Patient presents with    Knee Pain     f/u ZHANG inj L. Knee          History of Present Illness:       Patient is a 50 y.o. female returns follow up for the above complaint. The patient was last seen approximately 2 weeksago. The symptoms have improved she denies any adverse effects. Past Medical History:        Past Medical History:   Diagnosis Date    Anesthesia     slow to wake    Depression     Environmental allergies         Present Medications:         Current Outpatient Prescriptions   Medication Sig Dispense Refill    meloxicam (MOBIC) 15 MG tablet Take 1 tablet by mouth daily as needed for Pain 30 tablet 2    ketorolac (TORADOL) 10 MG tablet Take 1 tablet by mouth three times daily Discontinue all other NSAIDs during the course of treatment and resumed thereafter 15 tablet 0    lisinopril (PRINIVIL;ZESTRIL) 10 MG tablet       triamterene-hydrochlorothiazide (DYAZIDE) 37.5-25 MG per capsule Take 1 capsule by mouth daily 30 capsule 1    esomeprazole Magnesium (NEXIUM) 40 MG PACK Take 40 mg by mouth daily      venlafaxine (EFFEXOR-XR) 150 MG XR capsule Take 150 mg by mouth daily      levocetirizine (XYZAL) 5 MG tablet Take 5 mg by mouth daily      pseudoephedrine (SUDAFED) 30 MG tablet Take 30 mg by mouth daily      UNABLE TO FIND Allergy shots weekly      Multiple Vitamins-Minerals (THERAPEUTIC MULTIVITAMIN-MINERALS) tablet Take 1 tablet by mouth daily      ferrous sulfate 325 (65 FE) MG tablet Take 325 mg by mouth daily (with breakfast)       No current facility-administered medications for this visit. Allergies: Allergies   Allergen Reactions    Latex      rash    Other      bandaid blister           Review of Systems:    Pertinent items are noted in HPI        Vital Signs: There were no vitals filed for this visit.      General Exam:     Constitutional: Patient is adequately groomed with no evidence of malnutrition    Physical Exam: left knee      Primary Exam:    Inspection:  No deformity atrophy or effusion         Skin: There are no rashes, ulcerations or lesions. Gait: nonantalgic      Neurovascular - non focal and intact       Additional Comments:        Additional Examinations:                   Office Imaging Results/Procedures PerformedToday:     Logic E Ultrasound/ 10 HZ    The patient was placed supine on the examination table with the knees supported. The   left     Lower extremity was slightly abducted . The ultrasound was placed on knee preset function and the linear transducer was placed transversely  over the medial patellofemoral joint and the medial patella femoral  joint recess was identified. The skin was prepped in sterile fashion. Sterile ultrasound gel  and topical anesthetic were utilized. Using axial technique, a 22 GA 40 mm needle was advanced under direct guidance into the medial patellofemoral joint recess and 2 ml of Euflexxa  was injected . The joint space was visualized distending with Injectate. There was no resistance to the Injectate. Patient tolerated this with minimal to no discomfort. Band-Aid applied to puncture wound. Technically successful ultrasound guided injection    The media patellafemoral joint recess was visualized in short axis. No evidence of effusion, soft tissue mass or cystic lesions. Office Procedures:     Orders Placed This Encounter   Procedures    US Guided Needle Placement     Order Specific Question:   Reason for exam:     Answer:   pain    EUFLEXXA INJ PER DOSE    DE ARTHROCENTESIS ASPIR&/INJ MAJOR JT/BURSA W/O US           Other Outside Imaging and Testing Personally Reviewed:       none          Assessment   Impression: . Encounter Diagnosis   Name Primary?     Primary osteoarthritis of left knee Yes              Plan:       postinjection protocol       Orders:        Orders Placed This Encounter   Procedures    US Guided Needle Placement     Order Specific Question:   Reason for exam:     Answer:   pain    EUFLEXXA INJ PER DOSE    MO ARTHROCENTESIS ASPIR&/INJ MAJOR JT/BURSA W/O US         Maite Carrillo MD.      Disclaimer: \"This note was dictated with voice recognition software. Though review and correction are routine, we apologize for any errors. \"

## 2017-11-02 NOTE — PROGRESS NOTES
Chief Complaint:   Chief Complaint   Patient presents with    Knee Pain     f/u L . knee pain HA inj          History of Present Illness:       Patient is a 50 y.o. female returns follow up for the above complaint. The patient was last seen approximately 1 weeksago. The symptoms   show no change since the last visit. The patient has had no further testing for the problem. Interval mild increase in pain on prompted by any specific injury or event       Present Medications:         Current Outpatient Prescriptions   Medication Sig Dispense Refill    meloxicam (MOBIC) 15 MG tablet Take 1 tablet by mouth daily as needed for Pain 30 tablet 2    ketorolac (TORADOL) 10 MG tablet Take 1 tablet by mouth three times daily Discontinue all other NSAIDs during the course of treatment and resumed thereafter 15 tablet 0    lisinopril (PRINIVIL;ZESTRIL) 10 MG tablet       triamterene-hydrochlorothiazide (DYAZIDE) 37.5-25 MG per capsule Take 1 capsule by mouth daily 30 capsule 1    esomeprazole Magnesium (NEXIUM) 40 MG PACK Take 40 mg by mouth daily      venlafaxine (EFFEXOR-XR) 150 MG XR capsule Take 150 mg by mouth daily      levocetirizine (XYZAL) 5 MG tablet Take 5 mg by mouth daily      pseudoephedrine (SUDAFED) 30 MG tablet Take 30 mg by mouth daily      UNABLE TO FIND Allergy shots weekly      Multiple Vitamins-Minerals (THERAPEUTIC MULTIVITAMIN-MINERALS) tablet Take 1 tablet by mouth daily      ferrous sulfate 325 (65 FE) MG tablet Take 325 mg by mouth daily (with breakfast)       No current facility-administered medications for this visit. Allergies: Allergies   Allergen Reactions    Latex      rash    Other      bandaid blister        Review of Symptoms:    Pertinent items are noted in HPI        Vital Signs: There were no vitals filed for this visit.      General Examination:    Constitutional: Patient is adequately groomed with no evidence of malnutrition       Physical Exam: left knee Primary Examination       Inspection:  No deformity atrophy or effusion           Skin: There are no rashes, ulcerations or lesions. Neurovascular - non focal and intact      Additional Comments:       Additional Examinations:              Office Imaging Results/Procedures PerformedToday:        Logic E Ultrasound/ 10 HZ    The patient was placed supine on the examination table with the knees supported. The  LT      Lower extremity was slightly abducted . The ultrasound was placed on knee preset function and the linear transducer was placed transversely  over the medial patellofemoral joint and the medial patella femoral  joint recess was identified. The skin was prepped in sterile fashion. Sterile ultrasound gel  and topical anesthetic were utilized. Using axial technique, a 22 GA 40 mm needle was advanced under direct guidance into the medial patellofemoral joint recess and 2 ml of Euflexxa  was injected . The joint space was visualized distending with Injectate. There was no resistance to the Injectate. Patient tolerated this with minimal to no discomfort. Band-Aid applied to puncture wound. Technically successful ultrasound guided injection    The media patellafemoral joint recess was visualized in short axis. No evidence of effusion, soft tissue mass or cystic lesions. Office Procedures:     Orders Placed This Encounter   Procedures    US Guided Needle Placement     Order Specific Question:   Reason for exam:     Answer:   knee pain    CA ARTHROCENTESIS ASPIR&/INJ MAJOR JT/BURSA W/O US    EUFLEXXA INJ PER DOSE           Other Outside Imaging and Testing Personally Reviewed:       none      Assessment   Impression: . Encounter Diagnosis   Name Primary?     Primary osteoarthritis of left knee Yes              Plan:       Postinjection protocol clinical review in 2 months  Wean off meloxicam transition to when necessary use  Continue/progress PT and continued use of functional knee

## 2017-11-10 ENCOUNTER — HOSPITAL ENCOUNTER (OUTPATIENT)
Dept: PHYSICAL THERAPY | Age: 52
Discharge: HOME | End: 2017-11-10
Admitting: INTERNAL MEDICINE

## 2017-11-10 NOTE — FLOWSHEET NOTE
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, left  · Treatment Diagnosis: M25.562 L knee pain  Insurance/Certification information:  PT Insurance Information: [employer], $25 co-pay, 30 visits  Physician Information:  Referring Practitioner: Henrique Carrillo MD  Plan of care signed (Y/N):     Date of Patient follow up with Physician: Yash Daily (if applicable):      Date G-Code Applied:    PT G-Codes  Functional Assessment Tool Used: LEFS  Score: 31.25% disability  Functional Limitation: Mobility: Walking and moving around  Mobility: Walking and Moving Around Current Status (): At least 20 percent but less than 40 percent impaired, limited or restricted  Mobility: Walking and Moving Around Goal Status (): At least 1 percent but less than 20 percent impaired, limited or restricted    Progress Note: [x]  Yes  []  No    Next due by: Visit #10       Latex Allergy:  [x]NO      []YES  Preferred Language for Healthcare:   [x]English       []other:    Visit # Insurance Allowable   6 30     Pain level: 2/10 Muscle cramping in lower compartment     SUBJECTIVE:   Reports having increased pain with left knee especially with sitting and having intermitant cramping. OBJECTIVE:   Observation:   Test measurements:    10/19/17  Increased flexion increases pain and pt demonstrates a TKE gait w/ moderate antalgia.      RESTRICTIONS/PRECAUTIONS: HTN  Exercises/Interventions:   Hamstring long sit stretch  3x30\"   Quad stretch Prone 3x30\"   Gastroc strengthening, flexibility, endurance, ROM of core, proximal hip and LE for functional self-care, mobility, lifting and ambulation/stair navigation   [] (64742)Reviewed/Progressed HEP activities related to improving balance, coordination, kinesthetic sense, posture, motor skill, proprioception of core, proximal hip and LE for self care, mobility, lifting, and ambulation/stair navigation      Manual Treatments:  PROM / STM / Oscillations-Mobs:  G-I, II, III, IV (PA's, Inf., Post.)  [x] (68741) Provided manual therapy to mobilize LE, proximal hip and/or LS spine soft tissue/joints for the purpose of modulating pain, promoting relaxation,  increasing ROM, reducing/eliminating soft tissue swelling/inflammation/restriction, improving soft tissue extensibility and allowing for proper ROM for normal function with self care, mobility, lifting and ambulation. Modalities:   '    Charges:  Timed Code Treatment Minutes: 50   Total Treatment Minutes: 50'     [] EVAL  [x] DE(59527) x  3   [] IONTO  [] NMR (14800) x      [] VASO  [] Manual (19819) x       [] Other:  [] TA x       [] Mech Traction (61565)  [] ES(attended) (70016)      [x] ES (un) (14453):     GOALS:  Patient stated goal: to not hurt    Therapist goals for Patient:   Short Term Goals: To be achieved in: 2 weeks  - Independent in HEP and progression per patient tolerance, in order to prevent re-injury. - Patient will have a decrease in pain to facilitate improvement in movement, function, and ADLs as indicated by Functional Deficits. Long Term Goals: To be achieved in: 6 weeks  - The patient is expected to demonstrate less than 20% impairment, limitation or restriction in:  - walking and moving around (mobility)  - Patient will demonstrate increased passive and active ROM to full, symmetrical and painfree to allow for proper joint functioning as indicated by patients Functional Deficits.    - Patient will demonstrate an increase in Strength to full and painfree to resistance testing to allow for proper functional mobility as indicated by patients Functional Deficits. - Patient will return to desired, higher level,  functional activities without increased symptoms or restriction. Progression Towards Functional goals:  [] Patient is progressing as expected towards functional goals listed. [] Progression is slowed due to complexities listed. [] Progression has been slowed due to co-morbidities. [x] Plan just implemented, too soon to assess goals progression  [] Other:     ASSESSMENT:  Pt performed all exercises well today, denied any pain and fatigued quickly. Endurance and quad strength denied any spasms. Treatment/Activity Tolerance:  [x] Patient tolerated treatment well [] Patient limited by fatique  [] Patient limited by pain  [] Patient limited by other medical complications  [] Other:   Prognosis: [x] Good [] Fair  [] Poor    Patient Requires Follow-up: [x] Yes  [] No    PLAN: Continue to push strength and stability with quad muscle as able. Monitor pain levels and modify job demands as able.      [x] Continue per plan of care [] Alter current plan (see comments)  [] Plan of care initiated [] Hold pending MD visit [] Discharge    Electronically signed by: Cristina Gramajo [facility], [id number]

## 2017-12-01 ENCOUNTER — HOSPITAL ENCOUNTER (OUTPATIENT)
Dept: PHYSICAL THERAPY | Age: 52
Discharge: HOME | End: 2017-12-31
Attending: INTERNAL MEDICINE | Admitting: INTERNAL MEDICINE

## 2017-12-12 ENCOUNTER — OFFICE VISIT (OUTPATIENT)
Dept: ORTHOPEDIC SURGERY | Age: 52
End: 2017-12-12

## 2017-12-12 DIAGNOSIS — S86.911A KNEE STRAIN, RIGHT, INITIAL ENCOUNTER: ICD-10-CM

## 2017-12-12 DIAGNOSIS — J01.00 ACUTE NON-RECURRENT MAXILLARY SINUSITIS: ICD-10-CM

## 2017-12-12 DIAGNOSIS — M94.262 CHONDROMALACIA, KNEE, LEFT: ICD-10-CM

## 2017-12-12 DIAGNOSIS — R25.2 LEG CRAMPING: ICD-10-CM

## 2017-12-12 DIAGNOSIS — M22.41 CHONDROMALACIA OF RIGHT PATELLA: Primary | ICD-10-CM

## 2017-12-12 PROCEDURE — 99213 OFFICE O/P EST LOW 20 MIN: CPT | Performed by: INTERNAL MEDICINE

## 2017-12-12 RX ORDER — AZITHROMYCIN 250 MG/1
TABLET, FILM COATED ORAL
Qty: 1 PACKET | Refills: 0 | Status: SHIPPED | OUTPATIENT
Start: 2017-12-12 | End: 2017-12-22

## 2017-12-12 RX ORDER — CYCLOBENZAPRINE HCL 10 MG
10 TABLET ORAL NIGHTLY PRN
Qty: 30 TABLET | Refills: 1 | Status: SHIPPED | OUTPATIENT
Start: 2017-12-12 | End: 2017-12-22

## 2017-12-12 NOTE — PROGRESS NOTES
Chief Complaint:   Chief Complaint   Patient presents with    Knee Pain     f/u L knee pain          History of Present Illness:       Patient is a 50 y.o. female returns follow up for the above complaint. The patient was last seen approximately 2 monthsago. The symptoms are improving since the last visit. The patient has had no further testing for the problem. She estimates at least 65% improvement overall. Treatment to date has included physical therapy and hyaluronic acid therapy-Euflexxa    Her main complaint relates to nocturnal cramping localizing to the calf. She apparently had normal electrolytes potassium and magnesium and further supplementation was not recommended as a trial.    She also localizes some level discomfort to the suprapatellar region/distal quadriceps region. Majority of her discomfort that localizes to the anterior and anterolateral aspect of the knee has subsided completely with targeted treatment of the patellofemoral joint. She is using a patellofemoral bracing only intermittently    No significant flares of pain in the interim    Concurrently she is complaining some upper respiratory symptoms inclusive of postnasal drip in addition to nasal congestion and maxillary sinus pressure. She is previously been diagnosed with sinusitis and she recognizes this as the onset.     She denies any constitutional symptoms fever chills or myalgias     Past Medical History:        Past Medical History:   Diagnosis Date    Anesthesia     slow to wake    Depression     Environmental allergies         Present Medications:         Current Outpatient Prescriptions   Medication Sig Dispense Refill    azithromycin (ZITHROMAX) 250 MG tablet Take as directed 1 packet 0    cyclobenzaprine (FLEXERIL) 10 MG tablet Take 1 tablet by mouth nightly as needed for Muscle spasms 30 tablet 1    meloxicam (MOBIC) 15 MG tablet Take 1 tablet by mouth daily as needed for Pain 30 tablet 2    ketorolac (TORADOL) negative apprehension      Skin: There are no rashes, ulcerations or lesions. Gait: nonantalgic  Neurovascular - non focal and intact       Additional Comments:        Additional Examinations:                    Office Imaging Results/Procedures PerformedToday:             Office Procedures:     Orders Placed This Encounter   Procedures    Ambulatory referral to Physical Therapy     Referral Priority:   Routine     Referral Type:   Eval and Treat     Referral Reason:   Specialty Services Required     Requested Specialty:   Physical Therapy     Number of Visits Requested:   1           Other Outside Imaging and Testing Personally Reviewed:               Assessment   Impression: . Encounter Diagnoses   Name Primary?  Chondromalacia of right patella Yes    Chondromalacia, knee, left     Knee strain, right, initial encounter     Leg cramping     Acute non-recurrent maxillary sinusitis               Plan:       Recommend trial of magnesium supplementation 250-400 mg for 2 week period of time for leg cramping  Formal course of PT inclusive of dry needling treatment myofascial program to address the nocturnal cramping  Trial of muscle relaxant daily at bedtime when necessary short-term for refractory cramping   azithromycin for acute/subacute maxillary sinusitis  Activity modification-PPP        Overall the believe her knee condition has improved considerably and has responded to aggressive conservative measures primarily related to patellofemoral mediated pain. Believe the MRI finding of a nondisplaced flap tear posterior horn root is  Clinically insignificant  However there is some pain provocation with lateral Monico's stressing and hyper knee flexion which  Although this is not the patient with ADLs.   This more than likely relates to the knee chondromalacia affecting the tibial plateau at this anatomic location       Orders:        Orders Placed This Encounter   Procedures    Ambulatory referral to

## 2018-08-30 ENCOUNTER — TELEPHONE (OUTPATIENT)
Dept: ORTHOPEDIC SURGERY | Age: 53
End: 2018-08-30

## 2018-09-11 ENCOUNTER — TELEPHONE (OUTPATIENT)
Dept: ORTHOPEDIC SURGERY | Age: 53
End: 2018-09-11

## 2019-05-28 ENCOUNTER — OFFICE VISIT (OUTPATIENT)
Dept: ORTHOPEDIC SURGERY | Age: 54
End: 2019-05-28
Payer: COMMERCIAL

## 2019-05-28 VITALS — HEART RATE: 104 BPM | SYSTOLIC BLOOD PRESSURE: 122 MMHG | DIASTOLIC BLOOD PRESSURE: 79 MMHG

## 2019-05-28 DIAGNOSIS — M25.561 ACUTE PAIN OF RIGHT KNEE: ICD-10-CM

## 2019-05-28 DIAGNOSIS — M22.41 CHONDROMALACIA OF RIGHT PATELLA: ICD-10-CM

## 2019-05-28 DIAGNOSIS — M17.10 OSTEOARTHRITIS OF PATELLOFEMORAL JOINT, UNSPECIFIED LATERALITY: ICD-10-CM

## 2019-05-28 DIAGNOSIS — S83.281A TEAR OF LATERAL MENISCUS OF RIGHT KNEE, CURRENT, UNSPECIFIED TEAR TYPE, INITIAL ENCOUNTER: Primary | ICD-10-CM

## 2019-05-28 DIAGNOSIS — M22.42 CHONDROMALACIA OF PATELLA, LEFT: ICD-10-CM

## 2019-05-28 PROCEDURE — 99214 OFFICE O/P EST MOD 30 MIN: CPT | Performed by: INTERNAL MEDICINE

## 2019-05-28 RX ORDER — MELOXICAM 15 MG/1
15 TABLET ORAL DAILY PRN
Qty: 30 TABLET | Refills: 1 | Status: SHIPPED | OUTPATIENT
Start: 2019-05-28

## 2019-05-28 NOTE — PROGRESS NOTES
Chief Complaint:   Chief Complaint   Patient presents with    Knee Pain     OPNP R KNEE PAIN          History of Present Illness:       Patient is a 48 y.o. female presents with the above complaint. The symptoms began 1 yearsago and started without an injury. The patient describes a sharp, aching pain that does not radiate. The symptoms are near constant  and are are worsening since the onset. Symptoms are worsening with respect to pain     Pain localizes to the lateral side of the knee/lateral parapatellar region and  does seem to follow a  patella femoral provacative pattern, however, there are mechanical symptoms that may suggest meniscal injury. The patient admits to subjective instability about the knee and denies new onset or progressive weakness of the lower extremity. Symptoms are generally worsen with increased activity levels    Pain level 8    The patient denies a pattern of activity related swelling. Treatment to date: NSAIDS OTC with mild improvement. There is no prior history of knee trauma. Past orthopedic history significant for patellofemoral osteoarthritis that responded to hyaluronic acid therapy on the left    There is no prior history of autoimmune disease, crystal arthropathy, or crystal arthropathy.              Past Medical History:        Past Medical History:   Diagnosis Date    Anesthesia     slow to wake    Depression     Environmental allergies          Past Surgical History:   Procedure Laterality Date    APPENDECTOMY      DILATION AND CURETTAGE OF UTERUS      x2         Present Medications:         Current Outpatient Medications   Medication Sig Dispense Refill    meloxicam (MOBIC) 15 MG tablet Take 1 tablet by mouth daily as needed for Pain 30 tablet 1    lisinopril (PRINIVIL;ZESTRIL) 10 MG tablet       triamterene-hydrochlorothiazide (DYAZIDE) 37.5-25 MG per capsule Take 1 capsule by mouth daily 30 capsule 1    esomeprazole Magnesium (NEXIUM) 40 MG PACK Take 40 mg by mouth daily      venlafaxine (EFFEXOR-XR) 150 MG XR capsule Take 150 mg by mouth daily      levocetirizine (XYZAL) 5 MG tablet Take 5 mg by mouth daily      pseudoephedrine (SUDAFED) 30 MG tablet Take 30 mg by mouth daily      UNABLE TO FIND Allergy shots weekly      Multiple Vitamins-Minerals (THERAPEUTIC MULTIVITAMIN-MINERALS) tablet Take 1 tablet by mouth daily      ferrous sulfate 325 (65 FE) MG tablet Take 325 mg by mouth daily (with breakfast)      ketorolac (TORADOL) 10 MG tablet Take 1 tablet by mouth three times daily Discontinue all other NSAIDs during the course of treatment and resumed thereafter 15 tablet 0     No current facility-administered medications for this visit. Allergies:         Allergies   Allergen Reactions    Latex      rash    Other      bandaid blister        Social History:         Social History     Socioeconomic History    Marital status:      Spouse name: Not on file    Number of children: Not on file    Years of education: Not on file    Highest education level: Not on file   Occupational History    Not on file   Social Needs    Financial resource strain: Not on file    Food insecurity:     Worry: Not on file     Inability: Not on file    Transportation needs:     Medical: Not on file     Non-medical: Not on file   Tobacco Use    Smoking status: Never Smoker    Smokeless tobacco: Never Used   Substance and Sexual Activity    Alcohol use: Yes     Comment: soc    Drug use: No    Sexual activity: Not on file   Lifestyle    Physical activity:     Days per week: Not on file     Minutes per session: Not on file    Stress: Not on file   Relationships    Social connections:     Talks on phone: Not on file     Gets together: Not on file     Attends Hoahaoism service: Not on file     Active member of club or organization: Not on file     Attends meetings of clubs or organizations: Not on file     Relationship status: Not on file    Intimate normal L2-S1. No fasiculations. Pattella tendon and Achilles tendon reflexes +2 bilaterally. Seated SLR negative          Office Imaging Results/Procedures PerformedToday:      Radiology:      X-rays obtained and reviewed in office:   Views 3 views right knee comparison merchant/lateral left knee   Location right knee   Impression lateral projection reveals the patellofemoral joint is anatomic merchant projection reveals to be femoral joints have a normal radiographic appearance with only low-grade/grade 1 degenerative change. Merchant projection reveals mild lateral tilting no other soft tissue or osseous abnormalities       Office Procedures:     Orders Placed This Encounter   Procedures    XR KNEE LEFT (1-2 VIEWS)    XR KNEE RIGHT (3 VIEWS)    MRI KNEE LEFT WO CONTRAST     Standing Status:   Future     Standing Expiration Date:   5/28/2020     Scheduling Instructions:      Maryellen Rangel 5/30/19     Order Specific Question:   Reason for exam:     Answer:   R/O MMT    EUFLEXXA INJ PER DOSE     Standing Status:   Future     Standing Expiration Date:   5/27/2020    Breg Freerunner Knee Brace     Patient was prescribed a Breg Freerunner knee brace. The left knee will require stabilization / immobilization from this semi-rigid / rigid orthosis to improve their function. The orthosis will assist in protecting the affected area, provide functional support and facilitate healing. The patient was educated and fit by a healthcare professional with expert knowledge and specialization in brace application while under the direct supervision of the physician. Verbal and written instructions for the use of and application of this item were provided. They were instructed to contact the office immediately should the brace result in increased pain, decreased sensation, increased swelling or worsening of the condition.            Other Outside Imaging and Testing Personally Reviewed:    Xr Knee Left (1-2 Freerunner knee brace. The left knee will require stabilization / immobilization from this semi-rigid / rigid orthosis to improve their function. The orthosis will assist in protecting the affected area, provide functional support and facilitate healing. The patient was educated and fit by a healthcare professional with expert knowledge and specialization in brace application while under the direct supervision of the physician. Verbal and written instructions for the use of and application of this item were provided. They were instructed to contact the office immediately should the brace result in increased pain, decreased sensation, increased swelling or worsening of the condition. Disclaimer: \"This note was dictated with voice recognition software. Though review and correction are routine, we apologize for any errors. \"

## 2019-05-29 ENCOUNTER — TELEPHONE (OUTPATIENT)
Dept: ORTHOPEDIC SURGERY | Age: 54
End: 2019-05-29

## 2019-05-29 NOTE — TELEPHONE ENCOUNTER
5[place]29/19  [facility] [place]  [place] PRECERT REQUIRED - PER JOYCE -   REF #[id number] -  NDS

## 2019-05-30 ENCOUNTER — TELEPHONE (OUTPATIENT)
Dept: ORTHOPEDIC SURGERY | Age: 54
End: 2019-05-30

## 2019-06-03 ENCOUNTER — OFFICE VISIT (OUTPATIENT)
Dept: ORTHOPEDIC SURGERY | Age: 54
End: 2019-06-03
Payer: COMMERCIAL

## 2019-06-03 VITALS — BODY MASS INDEX: 23.85 KG/M2 | WEIGHT: 161 LBS | HEIGHT: 69 IN

## 2019-06-03 DIAGNOSIS — S86.911D KNEE STRAIN, RIGHT, SUBSEQUENT ENCOUNTER: ICD-10-CM

## 2019-06-03 DIAGNOSIS — M76.891: Primary | ICD-10-CM

## 2019-06-03 PROBLEM — M17.11 PRIMARY OSTEOARTHRITIS OF RIGHT KNEE: Status: ACTIVE | Noted: 2019-06-03

## 2019-06-03 PROBLEM — M94.261 CHONDROMALACIA, KNEE, RIGHT: Status: ACTIVE | Noted: 2019-06-03

## 2019-06-03 PROCEDURE — L1812 KO ELASTIC W/JOINTS PRE OTS: HCPCS | Performed by: INTERNAL MEDICINE

## 2019-06-03 PROCEDURE — 99213 OFFICE O/P EST LOW 20 MIN: CPT | Performed by: INTERNAL MEDICINE

## 2019-06-03 NOTE — PROGRESS NOTES
Chief Complaint:   Chief Complaint   Patient presents with    Knee Pain     F/U R KNEE PAIN           History of Present Illness:       Patient is a 48 y.o. female returns follow up for the above complaint. The patient was last seen approximately 2 weeksago. The symptoms show no change since the last visit. The patient has had further testing for the problem. Overall at least low-grade primarily relating to restart of meloxicam.  No new injuries no new events. She noted discomfort with the toe femoral bracing trial    MRI completed as outlined below    Symptoms localized to the lateral compartment region of the knee primarily she denies any posterior medial pain     Past Medical History:        Past Medical History:   Diagnosis Date    Anesthesia     slow to wake    Depression     Environmental allergies         Present Medications:         Current Outpatient Medications   Medication Sig Dispense Refill    meloxicam (MOBIC) 15 MG tablet Take 1 tablet by mouth daily as needed for Pain 30 tablet 1    ketorolac (TORADOL) 10 MG tablet Take 1 tablet by mouth three times daily Discontinue all other NSAIDs during the course of treatment and resumed thereafter 15 tablet 0    lisinopril (PRINIVIL;ZESTRIL) 10 MG tablet       triamterene-hydrochlorothiazide (DYAZIDE) 37.5-25 MG per capsule Take 1 capsule by mouth daily 30 capsule 1    esomeprazole Magnesium (NEXIUM) 40 MG PACK Take 40 mg by mouth daily      venlafaxine (EFFEXOR-XR) 150 MG XR capsule Take 150 mg by mouth daily      levocetirizine (XYZAL) 5 MG tablet Take 5 mg by mouth daily      pseudoephedrine (SUDAFED) 30 MG tablet Take 30 mg by mouth daily      UNABLE TO FIND Allergy shots weekly      Multiple Vitamins-Minerals (THERAPEUTIC MULTIVITAMIN-MINERALS) tablet Take 1 tablet by mouth daily      ferrous sulfate 325 (65 FE) MG tablet Take 325 mg by mouth daily (with breakfast)       No current facility-administered medications for this visit. Allergies: Allergies   Allergen Reactions    Latex      rash    Other      bandaid blister           Review of Systems:    Pertinent items are noted in HPI      Vital Signs: There were no vitals filed for this visit. General Exam:     Constitutional: Patient is adequately groomed with no evidence of malnutrition    Physical Exam: right jaw      Primary Exam:    Inspection:  No deformity atrophy or appreciable effusion      Palpation:  Mild tenderness over the LJL      Range of Motion:  Stable unchanged from previous      Strength:  Normal with SLR      Special Tests:  Lateral Monico stressing positive for pain reproducing her subjective pain      Skin: There are no rashes, ulcerations or lesions. Gait: Nonantalgic    Neurovascular - non focal and intact       Additional Comments:        Additional Examinations:           \         Office Imaging Results/Procedures PerformedToday:             Office Procedures:     Orders Placed This Encounter   Procedures    Breg Economy Hinged Knee Brace     Patient was prescribed a Breg Economy Hinged Knee Brace. The right knee will require stabilization / immobilization from this semi-rigid / rigid orthosis to improve their function. The orthosis will assist in protecting the affected area, provide functional support and facilitate healing. The patient was educated and fit by a healthcare professional with expert knowledge and specialization in brace application while under the direct supervision of the treating physician. Verbal and written instructions for the use of and application of this item were provided. They were instructed to contact the office immediately should the brace result in increased pain, decreased sensation, increased swelling or worsening of the condition. Other Outside Imaging and Testing Personally Reviewed:    Xr Knee Left (1-2 Views)    Result Date: 5/28/2019  Radiology exam is complete.  No Radiologist dictation. Please follow up with ordering provider. Xr Knee Right (3 Views)    Result Date: 2019  Radiology exam is complete. No Radiologist dictation. Please follow up with ordering provider. Mri Lower Extremity W Jt Wo Contrast    Result Date: 2019  Site: [facility] #: [id number] #: [id number] Procedure: MR Right Knee w/o Contrast ; Reason for Exam: Dx, Medial meniscus tear This document is confidential medical information. Unauthorized disclosure or use of this information is prohibited by law. If you are not the intended recipient of this document, please advise us by calling immediately [phone]. [facility] [address] Patient Name: Bill Urbano Case ID: [id number] Patient : 1965 Referring Physician: Raj Hayden MD Exam Date: 2019 Exam Description: MR Right Knee w/o Contrast HISTORY:  Medial meniscus tear. TECHNICAL FACTORS:  Long- and short-axis fat- and water-weighted images were performed. COMPARISON:  None. FINDINGS:  ACL, PCL, LCL, MCL, flexor mechanism, and extensor mechanism are intact. Thickened MCL relates to sprain and capsulitis. Medial swelling is present. Dehisced Ayala's cyst is present. Medial meniscus is intact. Lateral meniscus is intact. Frayed free edge lateral meniscal body  is present. The soft tissues anteriorly are inflamed. No fracture, AVN or mass is demonstrated. CONCLUSION: 1. Thickened bbfobbji-[id number] MCL with surrounding inflammation relates to sprain and capsulitis. Anteromedial soft tissue swelling is contributory as is a dehisced Baker's cyst posteromedially. 2. No medial meniscal tear. 3. No cruciate tear. 4. Lateral meniscus body free edge and superior articular surface fraying. No macrotear. 5. Diffuse anterior soft tissue swelling. Regions of soft tissue contusion or sprain are suspected. 6. Please see above.  Thank you for the opportunity to provide your interpretation. Tamra Chavez MD A: Peg 05/31/2019 9:06 AM T: QUANG 05/31/2019 8:52 AM              Assessment   Impression: . Encounter Diagnoses   Name Primary?  Knee capsulitis, right Yes    Knee strain, right, subsequent encounter               Plan:     Continue meloxicam as per previous and transition to when necessary use after additional 1 or 2 more weeks   Functional knee bracing-hinged brace exchanged for a patellofemoral brace to use with ADLs. Formal course of PT when necessary  Intra-articular injection of steroid ultrasound-guided only for escalating pain levels       Orders:        Orders Placed This Encounter   Procedures    Breg Economy Hinged Knee Brace     Patient was prescribed a Breg Economy Hinged Knee Brace. The right knee will require stabilization / immobilization from this semi-rigid / rigid orthosis to improve their function. The orthosis will assist in protecting the affected area, provide functional support and facilitate healing. The patient was educated and fit by a healthcare professional with expert knowledge and specialization in brace application while under the direct supervision of the treating physician. Verbal and written instructions for the use of and application of this item were provided. They were instructed to contact the office immediately should the brace result in increased pain, decreased sensation, increased swelling or worsening of the condition. Odilon Patterson MD.      Disclaimer: \"This note was dictated with voice recognition software. Though review and correction are routine, we apologize for any errors. \"

## 2019-06-05 PROBLEM — S86.911D KNEE STRAIN, RIGHT, SUBSEQUENT ENCOUNTER: Status: ACTIVE | Noted: 2019-06-05

## 2019-07-01 ENCOUNTER — TELEPHONE (OUTPATIENT)
Dept: ORTHOPEDIC SURGERY | Age: 54
End: 2019-07-01

## 2020-06-19 ENCOUNTER — TELEPHONE (OUTPATIENT)
Dept: ORTHOPEDIC SURGERY | Age: 55
End: 2020-06-19

## 2024-04-05 ENCOUNTER — HOSPITAL ENCOUNTER (OUTPATIENT)
Age: 59
Setting detail: OUTPATIENT SURGERY
Discharge: HOME | End: 2024-04-05
Attending: ORTHOPAEDIC SURGERY | Admitting: ORTHOPAEDIC SURGERY
Payer: COMMERCIAL

## 2024-04-05 VITALS
DIASTOLIC BLOOD PRESSURE: 59 MMHG | BODY MASS INDEX: 27.56 KG/M2 | WEIGHT: 165.4 LBS | HEIGHT: 65 IN | RESPIRATION RATE: 16 BRPM | OXYGEN SATURATION: 97 % | SYSTOLIC BLOOD PRESSURE: 127 MMHG | TEMPERATURE: 97.9 F | HEART RATE: 76 BPM

## 2024-04-05 DIAGNOSIS — G56.02 CARPAL TUNNEL SYNDROME OF LEFT WRIST: Primary | ICD-10-CM

## 2024-04-05 PROCEDURE — 2709999900 HC NON-CHARGEABLE SUPPLY: Performed by: ORTHOPAEDIC SURGERY

## 2024-04-05 PROCEDURE — 7100000010 HC PHASE II RECOVERY - FIRST 15 MIN: Performed by: ORTHOPAEDIC SURGERY

## 2024-04-05 PROCEDURE — 7100000011 HC PHASE II RECOVERY - ADDTL 15 MIN: Performed by: ORTHOPAEDIC SURGERY

## 2024-04-05 PROCEDURE — 2500000003 HC RX 250 WO HCPCS: Performed by: ORTHOPAEDIC SURGERY

## 2024-04-05 PROCEDURE — 3600000014 HC SURGERY LEVEL 4 ADDTL 15MIN: Performed by: ORTHOPAEDIC SURGERY

## 2024-04-05 PROCEDURE — 6370000000 HC RX 637 (ALT 250 FOR IP): Performed by: ORTHOPAEDIC SURGERY

## 2024-04-05 PROCEDURE — 3600000004 HC SURGERY LEVEL 4 BASE: Performed by: ORTHOPAEDIC SURGERY

## 2024-04-05 RX ORDER — HYDROCODONE BITARTRATE AND ACETAMINOPHEN 5; 325 MG/1; MG/1
1 TABLET ORAL EVERY 6 HOURS PRN
Qty: 10 TABLET | Refills: 0 | Status: SHIPPED | OUTPATIENT
Start: 2024-04-05 | End: 2024-04-08

## 2024-04-05 RX ORDER — BACITRACIN ZINC AND POLYMYXIN B SULFATE 500; 1000 [USP'U]/G; [USP'U]/G
OINTMENT TOPICAL PRN
Status: DISCONTINUED | OUTPATIENT
Start: 2024-04-05 | End: 2024-04-05 | Stop reason: ALTCHOICE

## 2024-04-05 RX ORDER — LORATADINE 10 MG/1
10 TABLET ORAL DAILY
COMMUNITY
Start: 2023-12-14 | End: 2024-12-13

## 2024-04-05 RX ORDER — MONTELUKAST SODIUM 10 MG/1
1 TABLET ORAL NIGHTLY
COMMUNITY
Start: 2018-07-16

## 2024-04-05 RX ORDER — ASPIRIN 81 MG/1
1 TABLET ORAL DAILY
COMMUNITY
Start: 2023-06-19

## 2024-04-05 RX ORDER — MAGNESIUM GLUCONATE 30 MG(550)
30 TABLET ORAL
COMMUNITY

## 2024-04-05 RX ORDER — CITALOPRAM 40 MG/1
40 TABLET ORAL DAILY
COMMUNITY
Start: 2023-06-01

## 2024-04-05 ASSESSMENT — PAIN - FUNCTIONAL ASSESSMENT: PAIN_FUNCTIONAL_ASSESSMENT: NONE - DENIES PAIN

## 2024-04-05 ASSESSMENT — PAIN SCALES - GENERAL: PAINLEVEL_OUTOF10: 0

## 2024-04-05 NOTE — OP NOTE
[facility] Orthopaedics and Sports Medicine  Operative Report  [facility]          Yasmin Gambino (1965)    Date of Surgery- 4/5/2024    Pre-Op Diagnoses:  1.   left carpal tunnel syndrome  2.            Post-op Diagnoses:   1.  Same  2.           Procedure(s) Performed:  1.  left carpal tunnel release, mini open  2.           Surgeon: Kevan Tolbert MD MD    Assistant: Mercy SA       Anesthesia Type:   Local 1% lidocaine with epinephrine    Blood Loss:   less than 5ml     Pathology:   None    Complications:   None immediate apparent     Assistant:  Suzie CONSTANTINO       The left palm was marked in preop holding by Dr Tolbert after discussing the surgical procedure once again with the patient.  All questions and concerns were addressed.  The risks and benefits were discussed thoroughly involving mini-open carpal tunnel release.  These included, but were not limited to infection, tendon or nerve injury, scar or thenar sensitivity, need for transfusion, adverse effects of anesthesia, incomplete relief of numbness, pain, and/or weakness. Informed consent was on the chart.  . Local anesthetic was administered after timeout in the preoperative holding room with 1% lidocaine with epinephrine at the palm and carpal tunnel. The patient was brought to the operating and room and placed in the supine position.  After the induction of anesthesia a standard time-out was performed.       Description of the Procedure:     No tourniquet was used for this procedure. No antibiotics were indicated for this clean elective hand procedure.  The left upper extremity was prepped and draped in normal sterile fashion.  Formal timeout had been held before the injection and once again after the draping procedure.    A standard incision was  made using a 15 blade in the palm of the hand, dissection carried down through  skin and subcutaneous tissue as well as palmar fascia.  At this point  retractors were used to identify

## 2024-04-05 NOTE — DISCHARGE INSTRUCTIONS
[facility]opaedics [facility] Medicine   Post op Instructions for Hand and Upper Extremity Surgery    * Keep dressing dry and clean. It is ok to Shower just keep Dressing dry.  * Elevate operative arm.  * Ice 20 minutes on 20 minutes off.  * Follow-up appointment as scheduled by office staff. Check paperwork from office.    If no appointment scheduled call the office.  * If dressing is too tight, you may loosen Ace bandage and leave splint in place.  * Sling, as needed  * If you have any questions, refer to the office number listed below.    [phone] ([place] Administrative Office)

## 2024-04-05 NOTE — PROGRESS NOTES
Ambulatory Surgery/Procedure Discharge Note    Vitals:    04/05/24 1137   BP: (!) 127/59   Pulse: 76   Resp: 16   Temp:    SpO2: 97%   BP meets Neena standard    No intake/output data recorded.    Restroom use offered before discharge.  Yes    Pain assessment:  none  Pain Level: 0    Patient denies pain. Patient states numbness and tingling in left hand from local. Dressing is clean, dry and intact. VSS. Patient tolerating all PO intake. Patient offered to void before discharge. Discharge instructions given to patient and patients . Patient is ready for discharge.    Patient discharged to home/self care. Patient discharged via wheel chair by transporter to waiting family/S.O.       4/5/2024 12:10 PM

## 2024-04-05 NOTE — H&P
I have reviewed the history and physical and examined the patient and find no relevant changes.    I have reviewed with the patient and/or family the risks, benefits, and alternatives to the procedure.    Kevan Tolbert MD  4/5/2024

## 2024-04-05 NOTE — BRIEF OP NOTE
Brief Postoperative Note      Patient: Yasmin Gambino  YOB: 1965  MRN: [id number]    Date of Procedure: 4/5/2024    Pre-Op Diagnosis Codes:     * Left carpal tunnel syndrome [G56.02]    Post-Op Diagnosis: Same       Procedure(s):  LEFT CARPAL TUNNEL RELEASE    Surgeon(s):  Kevan Tolbert MD    Assistant:  Surgical Assistant: Lv Rivera RN; Anabella Alexander    Anesthesia: Local 1% lidocaine with epinephrine    Estimated Blood Loss (mL): less than 5ml      Complications: None immediate apparent     Specimens:   none    Implants:  NONE  Drains: none    Findings:  Infection Present At Time Of Surgery (PATOS) (choose all levels that have infection present):  No infection present  Other Findings: see fully dictated operative report     Electronically signed by Kevan Tolbert MD on 4/5/2024 at 11:41 AM

## (undated) DEVICE — UNDERGLOVE SURG SZ 8 BLU LTX FREE SYN POLYISOPRENE POLYMER

## (undated) DEVICE — HAND: Brand: MEDLINE INDUSTRIES, INC.

## (undated) DEVICE — COVER LT HNDL BLU PLAS

## (undated) DEVICE — TOWEL,STOP FLAG GOLD N-W: Brand: MEDLINE

## (undated) DEVICE — TRAY,IRRIGATION,BULBSYRINGE,60ML,CSR,PVP: Brand: MEDLINE

## (undated) DEVICE — 1010 S-DRAPE TOWEL DRAPE 10/BX: Brand: STERI-DRAPE™

## (undated) DEVICE — GLOVE ORTHO 7 1/2   MSG9475

## (undated) DEVICE — BANDAGE COBAN 4 IN COMPR W4INXL5YD FOAM COHESIVE QUIK STK SELF ADH SFT

## (undated) DEVICE — BANDAGE,GAUZE,CONFORMING,3"X75",STRL,LF: Brand: MEDLINE

## (undated) DEVICE — PADDING CAST W4INXL4YD HIGHLY ABSRB THAN COT EZ APPL

## (undated) DEVICE — ADAPTER CIRC OD22XID15MM FOR MON VENT PARAMETER

## (undated) DEVICE — GOWN,SIRUS,POLYRNF,BRTHSLV,XLN/XL,20/CS: Brand: MEDLINE

## (undated) DEVICE — PADDING,UNDERCAST,COTTON, 3X4YD STERILE: Brand: MEDLINE

## (undated) DEVICE — SHEET,DRAPE,53X77,STERILE: Brand: MEDLINE